# Patient Record
Sex: MALE
[De-identification: names, ages, dates, MRNs, and addresses within clinical notes are randomized per-mention and may not be internally consistent; named-entity substitution may affect disease eponyms.]

---

## 2021-06-14 PROBLEM — Z00.00 ENCOUNTER FOR PREVENTIVE HEALTH EXAMINATION: Status: ACTIVE | Noted: 2021-06-14

## 2021-06-24 ENCOUNTER — APPOINTMENT (OUTPATIENT)
Dept: NEUROSURGERY | Facility: CLINIC | Age: 46
End: 2021-06-24
Payer: COMMERCIAL

## 2021-06-24 DIAGNOSIS — I72.8 ANEURYSM OF OTHER SPECIFIED ARTERIES: ICD-10-CM

## 2021-06-24 PROCEDURE — 99443: CPT | Mod: 95

## 2021-06-25 ENCOUNTER — APPOINTMENT (OUTPATIENT)
Dept: ULTRASOUND IMAGING | Facility: CLINIC | Age: 46
End: 2021-06-25

## 2021-06-28 PROBLEM — I72.8: Status: ACTIVE | Noted: 2021-06-28

## 2021-06-28 NOTE — REASON FOR VISIT
[Home] : at home, [unfilled] , at the time of the visit. [Medical Office: ([facility])___] : at the medical office located in  [Spouse] : spouse [Verbal consent obtained from patient] : the patient, [unfilled] [New Patient Visit] : a new patient visit

## 2021-06-28 NOTE — ASSESSMENT
[FreeTextEntry1] : Impression: 46yr old male with two left frontal parasaggital dural based lesions; pulsating vein or artery on the side of patients head s/p head trauma\par \par Plan:\par Recommend MRI brain with contrast now to further evaluate these brain lesions \par MRA brain non con now to evaluate the pulsatile vein or artery he feels on the side of his head \par Follow up in our office after the imaging is completed

## 2021-06-28 NOTE — HISTORY OF PRESENT ILLNESS
[de-identified] : Chaim Mo is a 46yr old male on the phone for a new patient visit. May 2021 he his his head while at the dentist office. Underwent imaging with MRI brain at [facility] which incidentally noted two left fontal parasagittal dural based lesions. Additionally he feels a vein or artery on the side of his head is popping out. Today he is anxious about these findings. \par \par Neurology: Kristie Ly

## 2021-07-30 ENCOUNTER — NON-APPOINTMENT (OUTPATIENT)
Age: 46
End: 2021-07-30

## 2021-09-16 ENCOUNTER — APPOINTMENT (OUTPATIENT)
Dept: NEUROSURGERY | Facility: CLINIC | Age: 46
End: 2021-09-16
Payer: COMMERCIAL

## 2021-09-16 DIAGNOSIS — D32.0 BENIGN NEOPLASM OF CEREBRAL MENINGES: ICD-10-CM

## 2021-09-16 PROCEDURE — 99203 OFFICE O/P NEW LOW 30 MIN: CPT | Mod: 95

## 2021-09-17 PROBLEM — D32.0 CEREBRAL MENINGIOMA: Status: ACTIVE | Noted: 2021-06-28

## 2021-09-17 NOTE — HISTORY OF PRESENT ILLNESS
[FreeTextEntry1] :  May 2021 he his his head while at the dentist office. Underwent imaging with MRI brain at [facility] which incidentally noted two left fontal parasagittal dural based lesions. Additionally he feels a vein or artery on the side of his head is popping out.

## 2021-09-17 NOTE — REASON FOR VISIT
[Home] : at home, [unfilled] , at the time of the visit. [Medical Office: ([facility])___] : at the medical office located in  [Verbal consent obtained from patient] : the patient, [unfilled] [Follow-Up: _____] : a [unfilled] follow-up visit [FreeTextEntry1] : Chaim Mo is a 46yr old male on a telehealth visit to discuss his cerebral meningoma. He would like to know whether the concussion from hitting his head at the dentist caused these lesions. He would also like to discuss options such as radiosurgery vs surgery vs monitoring these lesions. He does find these lesions anxiety provoking.

## 2021-09-17 NOTE — ASSESSMENT
[FreeTextEntry1] : Impression: 46yr old male with two left frontal parasaggital dural based lesions; \par \par Plan:\par Discussed with patient these brain lesions most likely represent cerebral meningiomas. Discussed a meningioma is a benign tumor \par Discussed a concussion and him hitting his head can not cause these lesions \par Discussed there is no emergency to have these lesion treated \par Reviewed risks and benefits of surgical resection vs gamma knife radiosurgery vs conservative management with repeat imaging. \par At this time patient wishes to proceed with repeat imaging \par He will have MRI brain w/wo contrast done and then another discussion in the office afterwards

## 2024-08-13 ENCOUNTER — OFFICE (OUTPATIENT)
Facility: LOCATION | Age: 49
Setting detail: OPHTHALMOLOGY
End: 2024-08-13
Payer: COMMERCIAL

## 2024-08-13 DIAGNOSIS — H31.29: ICD-10-CM

## 2024-08-13 DIAGNOSIS — H43.811: ICD-10-CM

## 2024-08-13 DIAGNOSIS — H52.4: ICD-10-CM

## 2024-08-13 DIAGNOSIS — H25.13: ICD-10-CM

## 2024-08-13 DIAGNOSIS — H10.433: ICD-10-CM

## 2024-08-13 PROBLEM — H01.001 BLEPHARITIS; RIGHT UPPER LID, RIGHT LOWER LID, LEFT UPPER LID, LEFT LOWER LID: Status: ACTIVE | Noted: 2024-08-13

## 2024-08-13 PROBLEM — H01.004 BLEPHARITIS; RIGHT UPPER LID, RIGHT LOWER LID, LEFT UPPER LID, LEFT LOWER LID: Status: ACTIVE | Noted: 2024-08-13

## 2024-08-13 PROBLEM — H52.13 MYOPIA; BOTH EYES: Status: ACTIVE | Noted: 2024-08-13

## 2024-08-13 PROBLEM — H01.005 BLEPHARITIS; RIGHT UPPER LID, RIGHT LOWER LID, LEFT UPPER LID, LEFT LOWER LID: Status: ACTIVE | Noted: 2024-08-13

## 2024-08-13 PROBLEM — H01.002 BLEPHARITIS; RIGHT UPPER LID, RIGHT LOWER LID, LEFT UPPER LID, LEFT LOWER LID: Status: ACTIVE | Noted: 2024-08-13

## 2024-08-13 PROCEDURE — 92250 FUNDUS PHOTOGRAPHY W/I&R: CPT | Performed by: OPHTHALMOLOGY

## 2024-08-13 PROCEDURE — 92015 DETERMINE REFRACTIVE STATE: CPT | Performed by: OPHTHALMOLOGY

## 2024-08-13 PROCEDURE — 92004 COMPRE OPH EXAM NEW PT 1/>: CPT | Performed by: OPHTHALMOLOGY

## 2024-08-13 ASSESSMENT — CONFRONTATIONAL VISUAL FIELD TEST (CVF)
OS_FINDINGS: FULL
OD_FINDINGS: FULL

## 2024-08-13 ASSESSMENT — LID EXAM ASSESSMENTS
OS_BLEPHARITIS: LLL LUL 1+
OD_BLEPHARITIS: RLL RUL 1+

## 2024-08-15 PROBLEM — H31.29 PERIPAPILLARY ATROPHY ; BOTH EYES: Status: ACTIVE | Noted: 2024-08-13

## 2024-12-10 ENCOUNTER — APPOINTMENT (OUTPATIENT)
Dept: OTOLARYNGOLOGY | Facility: CLINIC | Age: 49
End: 2024-12-10
Payer: COMMERCIAL

## 2024-12-10 VITALS — HEART RATE: 78 BPM | SYSTOLIC BLOOD PRESSURE: 138 MMHG | DIASTOLIC BLOOD PRESSURE: 81 MMHG

## 2024-12-10 DIAGNOSIS — E04.2 NONTOXIC MULTINODULAR GOITER: ICD-10-CM

## 2024-12-10 PROCEDURE — 99204 OFFICE O/P NEW MOD 45 MIN: CPT

## 2024-12-10 RX ORDER — ASPIRIN 325 MG/1
TABLET, FILM COATED ORAL
Refills: 0 | Status: ACTIVE | COMMUNITY

## 2025-04-03 ENCOUNTER — APPOINTMENT (OUTPATIENT)
Dept: ULTRASOUND IMAGING | Facility: CLINIC | Age: 50
End: 2025-04-03
Payer: COMMERCIAL

## 2025-04-03 ENCOUNTER — OUTPATIENT (OUTPATIENT)
Dept: OUTPATIENT SERVICES | Facility: HOSPITAL | Age: 50
LOS: 1 days | End: 2025-04-03

## 2025-04-03 ENCOUNTER — RESULT REVIEW (OUTPATIENT)
Age: 50
End: 2025-04-03

## 2025-04-03 DIAGNOSIS — E04.2 NONTOXIC MULTINODULAR GOITER: ICD-10-CM

## 2025-04-03 PROCEDURE — 88173 CYTOPATH EVAL FNA REPORT: CPT | Mod: 26

## 2025-04-03 PROCEDURE — 10005 FNA BX W/US GDN 1ST LES: CPT

## 2025-04-04 LAB — NON-GYNECOLOGICAL CYTOLOGY STUDY: SIGNIFICANT CHANGE UP

## 2025-07-08 ENCOUNTER — APPOINTMENT (OUTPATIENT)
Dept: OTOLARYNGOLOGY | Facility: CLINIC | Age: 50
End: 2025-07-08
Payer: COMMERCIAL

## 2025-07-08 VITALS
HEART RATE: 87 BPM | HEIGHT: 70 IN | BODY MASS INDEX: 20.76 KG/M2 | WEIGHT: 145 LBS | DIASTOLIC BLOOD PRESSURE: 75 MMHG | SYSTOLIC BLOOD PRESSURE: 127 MMHG

## 2025-07-08 PROCEDURE — 99213 OFFICE O/P EST LOW 20 MIN: CPT
